# Patient Record
Sex: FEMALE | Race: BLACK OR AFRICAN AMERICAN | NOT HISPANIC OR LATINO | Employment: FULL TIME | URBAN - METROPOLITAN AREA
[De-identification: names, ages, dates, MRNs, and addresses within clinical notes are randomized per-mention and may not be internally consistent; named-entity substitution may affect disease eponyms.]

---

## 2023-10-16 DIAGNOSIS — Z12.31 ENCOUNTER FOR SCREENING MAMMOGRAM FOR MALIGNANT NEOPLASM OF BREAST: Primary | ICD-10-CM

## 2023-10-25 ENCOUNTER — HOSPITAL ENCOUNTER (OUTPATIENT)
Dept: RADIOLOGY | Facility: HOSPITAL | Age: 43
Discharge: HOME | End: 2023-10-25
Attending: FAMILY MEDICINE
Payer: OTHER GOVERNMENT

## 2023-10-25 DIAGNOSIS — Z12.31 ENCOUNTER FOR SCREENING MAMMOGRAM FOR MALIGNANT NEOPLASM OF BREAST: ICD-10-CM

## 2023-10-25 PROCEDURE — 77063 MAMMO DIGITAL SCREENING BILAT WITH TOMO: ICD-10-PCS | Mod: 26,,, | Performed by: RADIOLOGY

## 2023-10-25 PROCEDURE — 77067 MAMMO DIGITAL SCREENING BILAT WITH TOMO: ICD-10-PCS | Mod: 26,,, | Performed by: RADIOLOGY

## 2023-10-25 PROCEDURE — 77067 SCR MAMMO BI INCL CAD: CPT | Mod: 26,,, | Performed by: RADIOLOGY

## 2023-10-25 PROCEDURE — 77067 SCR MAMMO BI INCL CAD: CPT | Mod: TC,PO

## 2023-10-25 PROCEDURE — 77063 BREAST TOMOSYNTHESIS BI: CPT | Mod: 26,,, | Performed by: RADIOLOGY

## 2023-11-06 ENCOUNTER — TELEPHONE (OUTPATIENT)
Dept: PRIMARY CARE CLINIC | Facility: CLINIC | Age: 43
End: 2023-11-06
Payer: OTHER GOVERNMENT

## 2023-11-06 NOTE — TELEPHONE ENCOUNTER
Returned the patient call no answer Left a Voicemail.         ----- Message from Negrita Scanlon sent at 11/6/2023  1:39 PM CST -----  Type: Patient Call Back    Who called:Self     What is the request in detail: Asking for a call     Can the clinic reply by [facility]? No     Would the patient rather a call back or a response via [facility]? Call     Best call back number: [phone] (home)

## 2023-11-07 ENCOUNTER — TELEPHONE (OUTPATIENT)
Dept: PRIMARY CARE CLINIC | Facility: CLINIC | Age: 43
End: 2023-11-07
Payer: OTHER GOVERNMENT

## 2023-11-07 NOTE — TELEPHONE ENCOUNTER
Returned the patient call to inquire about her call patient states that she was not to received the call.  She was reaching to her primary care team Dr Valle.          ----- Message from Carol Martin sent at 11/7/2023 11:43 AM CST -----  Contact: [phone] Patient  Patient is returning a phone call.  Who left a message for the patient: Jojo Joyner MA  Does patient know what this is regarding:  ?  Would you like a call back, or a response through your [facility] portal?:   per pt whichever is the quickest  Comments:

## 2023-11-13 DIAGNOSIS — N63.10 MASS OF RIGHT BREAST: Primary | ICD-10-CM

## 2023-11-22 ENCOUNTER — HOSPITAL ENCOUNTER (OUTPATIENT)
Dept: RADIOLOGY | Facility: HOSPITAL | Age: 43
Discharge: HOME | End: 2023-11-22
Attending: FAMILY MEDICINE
Payer: OTHER GOVERNMENT

## 2023-11-22 DIAGNOSIS — N63.10 MASS OF RIGHT BREAST: ICD-10-CM

## 2023-11-22 PROCEDURE — 77065 DX MAMMO INCL CAD UNI: CPT | Mod: TC,RT

## 2023-11-22 PROCEDURE — 76642 US BREAST RIGHT LIMITED: ICD-10-PCS | Mod: 26,RT,, | Performed by: RADIOLOGY

## 2023-11-22 PROCEDURE — 77061 MAMMO DIGITAL DIAGNOSTIC RIGHT WITH TOMO: ICD-10-PCS | Mod: 26,RT,, | Performed by: RADIOLOGY

## 2023-11-22 PROCEDURE — 77065 DX MAMMO INCL CAD UNI: CPT | Mod: 26,RT,, | Performed by: RADIOLOGY

## 2023-11-22 PROCEDURE — 76642 ULTRASOUND BREAST LIMITED: CPT | Mod: TC,RT

## 2023-11-22 PROCEDURE — 77061 BREAST TOMOSYNTHESIS UNI: CPT | Mod: 26,RT,, | Performed by: RADIOLOGY

## 2023-11-22 PROCEDURE — 77065 MAMMO DIGITAL DIAGNOSTIC RIGHT WITH TOMO: ICD-10-PCS | Mod: 26,RT,, | Performed by: RADIOLOGY

## 2023-11-22 PROCEDURE — 76642 ULTRASOUND BREAST LIMITED: CPT | Mod: 26,RT,, | Performed by: RADIOLOGY

## 2023-11-29 ENCOUNTER — HOSPITAL ENCOUNTER (OUTPATIENT)
Dept: RADIOLOGY | Facility: HOSPITAL | Age: 43
Discharge: HOME | End: 2023-11-29
Attending: FAMILY MEDICINE
Payer: OTHER GOVERNMENT

## 2023-11-29 VITALS — HEIGHT: 65 IN | BODY MASS INDEX: 33.32 KG/M2 | WEIGHT: 200 LBS

## 2023-11-29 DIAGNOSIS — R92.8 ABNORMAL MAMMOGRAM OF RIGHT BREAST: ICD-10-CM

## 2023-11-29 PROCEDURE — 77065 DX MAMMO INCL CAD UNI: CPT | Mod: TC,RT

## 2023-11-29 PROCEDURE — 77065 MAMMO DIGITAL DIAGNOSTIC RIGHT: ICD-10-PCS | Mod: 26,RT,, | Performed by: RADIOLOGY

## 2023-11-29 PROCEDURE — 77065 DX MAMMO INCL CAD UNI: CPT | Mod: 26,RT,, | Performed by: RADIOLOGY

## 2023-11-29 PROCEDURE — 88305 TISSUE EXAM BY PATHOLOGIST: ICD-10-PCS | Mod: 26,,, | Performed by: PATHOLOGY

## 2023-11-29 PROCEDURE — A4648 IMPLANTABLE TISSUE MARKER: HCPCS

## 2023-11-29 PROCEDURE — 88305 TISSUE EXAM BY PATHOLOGIST: CPT | Performed by: PATHOLOGY

## 2023-11-29 PROCEDURE — 27200939 US BREAST BIOPSY WITH IMAGING 1ST SITE RIGHT

## 2023-11-29 PROCEDURE — 19083 US BREAST BIOPSY WITH IMAGING 1ST SITE RIGHT: ICD-10-PCS | Mod: RT,,, | Performed by: RADIOLOGY

## 2023-11-29 PROCEDURE — 25000003 PHARM REV CODE 250

## 2023-11-29 PROCEDURE — 88305 TISSUE EXAM BY PATHOLOGIST: CPT | Mod: 26,,, | Performed by: PATHOLOGY

## 2023-11-29 PROCEDURE — 19083 BX BREAST 1ST LESION US IMAG: CPT | Mod: RT,,, | Performed by: RADIOLOGY

## 2023-11-29 RX ORDER — LIDOCAINE HYDROCHLORIDE AND EPINEPHRINE 20; 10 MG/ML; UG/ML
10 INJECTION, SOLUTION INFILTRATION; PERINEURAL ONCE
Status: COMPLETED | OUTPATIENT
Start: 2023-11-29 | End: 2023-11-29

## 2023-11-29 RX ORDER — LIDOCAINE HYDROCHLORIDE 20 MG/ML
10 INJECTION, SOLUTION INFILTRATION; PERINEURAL ONCE
Status: COMPLETED | OUTPATIENT
Start: 2023-11-29 | End: 2023-11-29

## 2023-11-29 RX ADMIN — LIDOCAINE HYDROCHLORIDE,EPINEPHRINE BITARTRATE 10 ML: 20; .01 INJECTION, SOLUTION INFILTRATION; PERINEURAL at 02:11

## 2023-11-29 RX ADMIN — LIDOCAINE HYDROCHLORIDE 10 ML: 20 INJECTION, SOLUTION INFILTRATION; PERINEURAL at 02:11

## 2023-12-04 LAB
FINAL PATHOLOGIC DIAGNOSIS: NORMAL
GROSS: NORMAL
Lab: NORMAL

## 2024-02-02 ENCOUNTER — OFFICE VISIT (OUTPATIENT)
Dept: SURGERY | Facility: CLINIC | Age: 44
End: 2024-02-02
Payer: OTHER GOVERNMENT

## 2024-02-02 VITALS
BODY MASS INDEX: 33.33 KG/M2 | HEART RATE: 73 BPM | WEIGHT: 200.06 LBS | HEIGHT: 65 IN | DIASTOLIC BLOOD PRESSURE: 89 MMHG | TEMPERATURE: 98 F | SYSTOLIC BLOOD PRESSURE: 135 MMHG

## 2024-02-02 DIAGNOSIS — N63.0 BREAST MASS IN FEMALE: Primary | ICD-10-CM

## 2024-02-02 DIAGNOSIS — N63.10 BREAST MASS, RIGHT: ICD-10-CM

## 2024-02-02 DIAGNOSIS — D24.1 BENIGN NEOPLASM OF RIGHT BREAST: ICD-10-CM

## 2024-02-02 PROCEDURE — 99215 OFFICE O/P EST HI 40 MIN: CPT | Mod: PBBFAC | Performed by: SURGERY

## 2024-02-02 PROCEDURE — 99203 OFFICE O/P NEW LOW 30 MIN: CPT | Mod: S$PBB,,, | Performed by: SURGERY

## 2024-02-02 PROCEDURE — 99999 PR PBB SHADOW E&M-EST. PATIENT-LVL V: CPT | Mod: PBBFAC,,, | Performed by: SURGERY

## 2024-02-02 RX ORDER — SODIUM CHLORIDE 9 MG/ML
INJECTION, SOLUTION INTRAVENOUS CONTINUOUS
Status: CANCELLED | OUTPATIENT
Start: 2024-02-02

## 2024-02-02 RX ORDER — CEFAZOLIN SODIUM 2 G/50ML
2 SOLUTION INTRAVENOUS
Status: CANCELLED | OUTPATIENT
Start: 2024-02-02

## 2024-02-02 RX ORDER — FERROUS SULFATE 325(65) MG
325 TABLET ORAL
COMMUNITY
Start: 2023-10-10

## 2024-02-02 RX ORDER — AMLODIPINE BESYLATE 10 MG/1
0.5 TABLET ORAL DAILY
COMMUNITY
Start: 2023-07-31

## 2024-02-02 RX ORDER — CHOLECALCIFEROL (VITAMIN D3) 50 MCG
50 TABLET ORAL
COMMUNITY
Start: 2023-03-30

## 2024-02-02 RX ORDER — ESCITALOPRAM OXALATE 20 MG/1
1 TABLET ORAL DAILY
COMMUNITY
Start: 2023-03-30 | End: 2024-02-05 | Stop reason: CLARIF

## 2024-02-02 RX ORDER — METOPROLOL TARTRATE 100 MG/1
50 TABLET ORAL
COMMUNITY
Start: 2023-09-29

## 2024-02-02 NOTE — H&P
History & Physical    SUBJECTIVE:     History of Present Illness:  Patient is a 43 y.o. female presents with palpable breast mass, s/p biopsy. She had identified a mass herself (on recommendation to perform self breast exam) and found a mass. Ultrasound was BR4 and biopsy performed w marker placement. Benign findings but warrants removal.  Has hx of breast cancer in maternal aunt.  She denies any major medical problems. Interested in surgery.    Chief Complaint   Patient presents with    Consult    Lump     Right breast        Review of patient's allergies indicates:   Allergen Reactions    Pseudoephedrine Nausea And Vomiting and Rash     Other Reaction(s): Eruption, Throat irritation       Current Outpatient Medications   Medication Sig Dispense Refill    amLODIPine (NORVASC) 10 MG tablet Take 0.5 tablets by mouth once daily.      cholecalciferol, vitamin D3, (VITAMIN D3) 50 mcg (2,000 unit) Tab 50 mcg.      EScitalopram oxalate (LEXAPRO) 20 MG tablet Take 1 tablet by mouth once daily.      ferrous sulfate (FEOSOL) 325 mg (65 mg iron) Tab tablet 325 mg.      metoprolol tartrate (LOPRESSOR) 100 MG tablet 50 mg.      acetaminophen-codeine 300-30mg (TYLENOL #3) 300-30 mg Tab Take 1 tablet by mouth every 6 (six) hours as needed (moderate to severe pain). 15 tablet 0    aspirin 81 MG Chew Take 81 mg by mouth once daily.      prenatal vit #108-iron-FA 30 mg iron- 800 mcg Tab Take by mouth.       No current facility-administered medications for this visit.       Past Medical History:   Diagnosis Date    Anemia     Bipolar 1 disorder     Migraine headache     Personality disorder     PTSD (post-traumatic stress disorder)     off medications since pregnancy     No past surgical history on file.  No family history on file.  Social History     Tobacco Use    Smoking status: Former     Current packs/day: 0.00     Types: Cigarettes     Quit date: 5/1/2013     Years since quitting: 10.7   Substance Use Topics    Alcohol use: Yes  "    Comment: 2x week    Drug use: No     Comment: 7 months THC        Review of Systems:  Review of Systems   Constitutional:  Negative for chills and fever.   HENT: Negative.     Eyes: Negative.    Respiratory:  Negative for cough, chest tightness and shortness of breath.    Cardiovascular: Negative.    Gastrointestinal:  Negative for abdominal pain, blood in stool, constipation, diarrhea, nausea and vomiting.   Endocrine: Negative for cold intolerance and heat intolerance.   Genitourinary: Negative.    Musculoskeletal: Negative.    Skin: Negative.  Negative for rash.   Neurological:  Negative for dizziness, syncope and light-headedness.   Psychiatric/Behavioral:  Negative for agitation, confusion and hallucinations.        OBJECTIVE:     Vital Signs (Most Recent)  Temp: 98.1 °F (36.7 °C) (02/02/24 1058)  Pulse: 73 (02/02/24 1058)  BP: 135/89 (02/02/24 1058)  5' 5" (1.651 m)  90.8 kg (200 lb 1.1 oz)     Physical Exam:  Physical Exam  Constitutional:       General: She is not in acute distress.     Appearance: She is well-developed. She is not diaphoretic.   HENT:      Head: Normocephalic and atraumatic.   Eyes:      Conjunctiva/sclera: Conjunctivae normal.      Pupils: Pupils are equal, round, and reactive to light.   Cardiovascular:      Rate and Rhythm: Normal rate and regular rhythm.      Pulses: Normal pulses.      Heart sounds: Normal heart sounds.   Pulmonary:      Effort: Pulmonary effort is normal. No respiratory distress.      Breath sounds: Normal breath sounds. No stridor. No wheezing.   Chest:   Breasts:     Right: Mass present.      Left: Normal.          Comments: Breast mass, difficult to separate on exam from areolar tissue, but has some scattered thickened areas.  Abdominal:      General: Bowel sounds are normal. There is no distension.      Palpations: Abdomen is soft.      Tenderness: There is no abdominal tenderness.   Musculoskeletal:         General: Normal range of motion.      Cervical back: " Normal range of motion and neck supple.   Lymphadenopathy:      Upper Body:      Right upper body: No supraclavicular, axillary or pectoral adenopathy.      Left upper body: No supraclavicular, axillary or pectoral adenopathy.   Skin:     General: Skin is warm and dry.      Findings: No rash.   Neurological:      Mental Status: She is alert and oriented to person, place, and time.      Cranial Nerves: No cranial nerve deficit.   Psychiatric:         Behavior: Behavior normal.       Diagnostic Results:  US: Reviewed  Br4   US Breast Right Limited  Right  Mass: There is an 8 mm x 12 mm x 14 mm oval, parallel, heterogeneous mass with circumscribed margins with posterior enhancement seen in the retroareolar region of the right breast in the middle depth.      Impression:  Right  Mass: Right breast mass at the central middle position. Assessment: 4 - Suspicious finding. Biopsy is recommended.      BI-RADS Category:   Overall: 4 - Suspicious    pathology  Final Pathologic Diagnosis Right breast, retroareolar, biopsy:  Fibroepithelial lesion with myxoid stroma  No evidence of atypia or malignancy       ASSESSMENT/PLAN:     43 yr old black lady w right sided breast mass    PLAN:Plan     To OR for needle loc partial mastectomy/lumpectomy on 2/8/24  Risks and side effects discussed with the patient. Alterative therapies discussed. Patient agreeable to procedure and has signed consent which was discussed in detail.    Duncan Pineda  General Surgery - [facility]  2/2/2024  11:39 AM

## 2024-02-05 ENCOUNTER — HOSPITAL ENCOUNTER (OUTPATIENT)
Dept: RADIOLOGY | Facility: HOSPITAL | Age: 44
Discharge: HOME | End: 2024-02-05
Attending: SURGERY
Payer: OTHER GOVERNMENT

## 2024-02-05 ENCOUNTER — HOSPITAL ENCOUNTER (OUTPATIENT)
Dept: PREADMISSION TESTING | Facility: HOSPITAL | Age: 44
Discharge: HOME | End: 2024-02-05
Attending: SURGERY
Payer: OTHER GOVERNMENT

## 2024-02-05 ENCOUNTER — ANESTHESIA EVENT (OUTPATIENT)
Dept: SURGERY | Facility: HOSPITAL | Age: 44
End: 2024-02-05
Payer: OTHER GOVERNMENT

## 2024-02-05 VITALS
BODY MASS INDEX: 33.02 KG/M2 | HEART RATE: 70 BPM | SYSTOLIC BLOOD PRESSURE: 134 MMHG | WEIGHT: 198.19 LBS | DIASTOLIC BLOOD PRESSURE: 87 MMHG | RESPIRATION RATE: 20 BRPM | TEMPERATURE: 98 F | OXYGEN SATURATION: 99 % | HEIGHT: 65 IN

## 2024-02-05 DIAGNOSIS — Z01.818 PREOP TESTING: Primary | ICD-10-CM

## 2024-02-05 DIAGNOSIS — N63.0 BREAST MASS IN FEMALE: ICD-10-CM

## 2024-02-05 LAB
ANION GAP SERPL CALC-SCNC: 7 MMOL/L (ref 8–16)
BASOPHILS # BLD AUTO: 0.03 K/UL (ref 0–0.2)
BASOPHILS NFR BLD: 0.5 % (ref 0–1.9)
BUN SERPL-MCNC: 12 MG/DL (ref 6–20)
CALCIUM SERPL-MCNC: 9.7 MG/DL (ref 8.7–10.5)
CHLORIDE SERPL-SCNC: 107 MMOL/L (ref 95–110)
CO2 SERPL-SCNC: 26 MMOL/L (ref 23–29)
CREAT SERPL-MCNC: 0.8 MG/DL (ref 0.5–1.4)
DIFFERENTIAL METHOD BLD: ABNORMAL
EOSINOPHIL # BLD AUTO: 0.1 K/UL (ref 0–0.5)
EOSINOPHIL NFR BLD: 0.8 % (ref 0–8)
ERYTHROCYTE [DISTWIDTH] IN BLOOD BY AUTOMATED COUNT: 14.6 % (ref 11.5–14.5)
EST. GFR  (NO RACE VARIABLE): >60 ML/MIN/1.73 M^2
GLUCOSE SERPL-MCNC: 91 MG/DL (ref 70–110)
HCT VFR BLD AUTO: 36.4 % (ref 37–48.5)
HGB BLD-MCNC: 11.9 G/DL (ref 12–16)
IMM GRANULOCYTES # BLD AUTO: 0.01 K/UL (ref 0–0.04)
IMM GRANULOCYTES NFR BLD AUTO: 0.2 % (ref 0–0.5)
LYMPHOCYTES # BLD AUTO: 2.8 K/UL (ref 1–4.8)
LYMPHOCYTES NFR BLD: 42.8 % (ref 18–48)
MCH RBC QN AUTO: 30 PG (ref 27–31)
MCHC RBC AUTO-ENTMCNC: 32.7 G/DL (ref 32–36)
MCV RBC AUTO: 92 FL (ref 82–98)
MONOCYTES # BLD AUTO: 0.4 K/UL (ref 0.3–1)
MONOCYTES NFR BLD: 5.9 % (ref 4–15)
NEUTROPHILS # BLD AUTO: 3.2 K/UL (ref 1.8–7.7)
NEUTROPHILS NFR BLD: 49.8 % (ref 38–73)
NRBC BLD-RTO: 0 /100 WBC
OHS QRS DURATION: 82 MS
OHS QTC CALCULATION: 363 MS
PLATELET # BLD AUTO: 208 K/UL (ref 150–450)
PMV BLD AUTO: 11.3 FL (ref 9.2–12.9)
POTASSIUM SERPL-SCNC: 3.8 MMOL/L (ref 3.5–5.1)
RBC # BLD AUTO: 3.97 M/UL (ref 4–5.4)
SODIUM SERPL-SCNC: 140 MMOL/L (ref 136–145)
WBC # BLD AUTO: 6.45 K/UL (ref 3.9–12.7)

## 2024-02-05 PROCEDURE — 80048 BASIC METABOLIC PNL TOTAL CA: CPT | Performed by: SURGERY

## 2024-02-05 PROCEDURE — 93010 ELECTROCARDIOGRAM REPORT: CPT | Mod: ,,, | Performed by: INTERNAL MEDICINE

## 2024-02-05 PROCEDURE — 71046 X-RAY EXAM CHEST 2 VIEWS: CPT | Mod: TC,FY

## 2024-02-05 PROCEDURE — 93005 ELECTROCARDIOGRAM TRACING: CPT

## 2024-02-05 PROCEDURE — 85025 COMPLETE CBC W/AUTO DIFF WBC: CPT | Performed by: SURGERY

## 2024-02-05 PROCEDURE — 71046 X-RAY EXAM CHEST 2 VIEWS: CPT | Mod: 26,,, | Performed by: RADIOLOGY

## 2024-02-05 NOTE — ANESTHESIA PREPROCEDURE EVALUATION
02/05/2024  Mili Newsome is a 43 y.o., female  To undergo Procedure(s) (LRB):  BIOPSY, BREAST, WITH LUMPECTOMY (Right)     Denies CP/SOB/GERD/MI/CVA/URI symptoms.  METS > 4  NPO > 8    Past Medical History:  Past Medical History:   Diagnosis Date    Anemia     Bipolar 1 disorder     Hypertension     Migraine headache     Personality disorder     PTSD (post-traumatic stress disorder)     off medications since pregnancy       Past Surgical History:  Past Surgical History:   Procedure Laterality Date    DILATION AND CURETTAGE OF UTERUS      X 2    TUBAL LIGATION         Social History:  Social History     Socioeconomic History    Marital status: Single   Tobacco Use    Smoking status: Former     Current packs/day: 0.00     Types: Cigarettes     Quit date: 5/1/2013     Years since quitting: 10.7   Substance and Sexual Activity    Alcohol use: Yes     Comment: 2x week    Drug use: No     Comment: 7 months THC     Social Determinants of Health     Financial Resource Strain: Low Risk  (2/2/2024)    Overall Financial Resource Strain (CARDIA)     Difficulty of Paying Living Expenses: Not hard at all   Food Insecurity: No Food Insecurity (2/2/2024)    Hunger Vital Sign     Worried About Running Out of Food in the Last Year: Never true     Ran Out of Food in the Last Year: Never true   Transportation Needs: Unmet Transportation Needs (2/2/2024)    PRAPARE - Transportation     Lack of Transportation (Medical): Yes   Physical Activity: Insufficiently Active (2/2/2024)    Exercise Vital Sign     Days of Exercise per Week: 5 days     Minutes of Exercise per Session: 20 min   Stress: Stress Concern Present (2/2/2024)    [employer] [place] [employer] - Occupational Stress Questionnaire     Feeling of Stress : To some extent   Social Connections: Unknown (2/2/2024)    Social Connection and Isolation Panel  [NHANES]     Frequency of Communication with Friends and Family: More than three times a week     Frequency of Social Gatherings with Friends and Family: Once a week     Active Member of Clubs or Organizations: Yes     Attends Club or Organization Meetings: More than 4 times per year     Marital Status:    Housing Stability: Low Risk  (2/2/2024)    Housing Stability Vital Sign     Unable to Pay for Housing in the Last Year: No     Number of Places Lived in the Last Year: 1     Unstable Housing in the Last Year: No       Medications:  No current facility-administered medications on file prior to encounter.     Current Outpatient Medications on File Prior to Encounter   Medication Sig Dispense Refill    cholecalciferol, vitamin D3, (VITAMIN D3) 50 mcg (2,000 unit) Tab 50 mcg.      ferrous sulfate (FEOSOL) 325 mg (65 mg iron) Tab tablet 325 mg.      amLODIPine (NORVASC) 10 MG tablet Take 0.5 tablets by mouth once daily.      metoprolol tartrate (LOPRESSOR) 100 MG tablet 50 mg.         Allergies:  Review of patient's allergies indicates:   Allergen Reactions    Pseudoephedrine Nausea And Vomiting and Rash     Other Reaction(s): Eruption, Throat irritation       Active Problems:  Patient Active Problem List   Diagnosis    Miscarriage       Diagnostic Studies:   Latest Reference Range & Units 02/05/24 14:25   WBC 3.90 - 12.70 K/uL 6.45   RBC 4.00 - 5.40 M/uL 3.97 (L)   Hemoglobin 12.0 - 16.0 g/dL 11.9 (L)   Hematocrit 37.0 - 48.5 % 36.4 (L)   MCV 82 - 98 fL 92   MCH 27.0 - 31.0 pg 30.0   MCHC 32.0 - 36.0 g/dL 32.7   RDW 11.5 - 14.5 % 14.6 (H)   Platelet Count 150 - 450 K/uL 208   MPV 9.2 - 12.9 fL 11.3   Gran % 38.0 - 73.0 % 49.8   Lymph % 18.0 - 48.0 % 42.8   Mono % 4.0 - 15.0 % 5.9   Eos % 0.0 - 8.0 % 0.8   Basophil % 0.0 - 1.9 % 0.5   Immature Granulocytes 0.0 - 0.5 % 0.2   Gran # (ANC) 1.8 - 7.7 K/uL 3.2   Lymph # 1.0 - 4.8 K/uL 2.8   Mono # 0.3 - 1.0 K/uL 0.4   Eos # 0.0 - 0.5 K/uL 0.1   Baso # 0.00 - 0.20  K/uL 0.03   Immature Grans (Abs) 0.00 - 0.04 K/uL 0.01   nRBC 0 /100 WBC 0   Differential Method  Automated      Latest Reference Range & Units 02/05/24 14:25   Sodium 136 - 145 mmol/L 140   Potassium 3.5 - 5.1 mmol/L 3.8   Chloride 95 - 110 mmol/L 107   CO2 23 - 29 mmol/L 26   Anion Gap 8 - 16 mmol/L 7 (L)   BUN 6 - 20 mg/dL 12   Creatinine 0.5 - 1.4 mg/dL 0.8   eGFR >60 mL/min/1.73 m^2 >60   Glucose 70 - 110 mg/dL 91   Calcium 8.7 - 10.5 mg/dL 9.7     EKG (2/5/24):  Sinus bradycardia   Minimal voltage criteria for LVH, may be normal variant ( R in aVL )     24 Hour Vitals:      See Nursing Charting For Additional Vitals    Pre-op Assessment    I have reviewed the Patient Summary Reports.     I have reviewed the Nursing Notes. I have reviewed the NPO Status.   I have reviewed the Medications.     Review of Systems  Anesthesia Hx:  No problems with previous Anesthesia             Denies Family Hx of Anesthesia complications.    Denies Personal Hx of Anesthesia complications.                    Social:  Social Alcohol Use, Former Smoker       Hematology/Oncology:       -- Anemia:                              Oncology Comments: R breast mass     Cardiovascular:  Exercise tolerance: good   Hypertension              ECG has been reviewed.  Functional Capacity good / => 4 METS                         Pulmonary:  Pulmonary Normal                       Hepatic/GI:  Hepatic/GI Normal                 Neurological:      Headaches                                 Endocrine:        Obesity / BMI > 30  Psych:  Psychiatric History                  Physical Exam  General: Well nourished and Cooperative    Airway:  Mallampati: III   Mouth Opening: Normal  TM Distance: Normal    Dental:  Intact    Chest/Lungs:  Clear to auscultation, Normal Respiratory Rate    Heart:  Rate: Normal  Rhythm: Regular Rhythm        Anesthesia Plan  Type of Anesthesia, risks & benefits discussed:    Anesthesia Type: Gen Supraglottic Airway, Gen  ETT  Intra-op Monitoring Plan: Standard ASA Monitors  Post Op Pain Control Plan: multimodal analgesia and IV/PO Opioids PRN  Induction:  IV  Informed Consent: Informed consent signed with the Patient and all parties understand the risks and agree with anesthesia plan.  All questions answered. Patient consented to blood products? Yes  ASA Score: 2  Anesthesia Plan Notes:   GA with LMA  Standard ASA monitors  Recovery in PACU  PONV: 3    Ready For Surgery From Anesthesia Perspective.     .

## 2024-02-05 NOTE — DISCHARGE INSTRUCTIONS
YOUR PROCEDURE WILL BE AT [facility] at [address] [place] [address]                  Before 7 AM, enter through the Emergency Entrance..   After 7 AM enter through the Main Entrance.                 Report to the Same Day Surgery Registration Desk in the hallway.(Just beside the Same Day Surgery Unit)      Your procedure  is scheduled for _____02/07/2024_____.    Call [phone] between 2pm and 5pm on ____02/06/2024___to find out your arrival time for the day of surgery.    You may have two visitors.  No children under 12 years old.     You will be going to the Same Day Surgery Unit on the 2nd floor of the hospital.    Important instructions:  Do not eat anything after midnight.  You may have plain water, non carbonated.  You may also have Gatorade or Powerade after midnight.    Stop all fluids 2 hours before your surgery.    It is okay to brush your teeth.  Do not have gum, candy or mints.    SEE MEDICATION SHEET.   TAKE MEDICATIONS AS DIRECTED WITH SIPS OF WATER.      Do not take any diabetic medication on the morning of surgery unless instructed to do so by your doctor or pre op nurse.      All GLP-1 weekly diabetic/weight loss medications must not be taken for one week before your surgery, or your surgery could be canceled.      STOP taking Aspirin, Ibuprofen,  Advil, Motrin, Mobic(meloxicam), Aleve (naproxen), Fish oil, and Vitamin E for at least 7 days before your surgery.     You may take Tylenol if needed which is not a blood thinner.    Please shower the night before and the morning of your surgery.      Follow any Prep Instructions given by your surgeon.    Use Chlorhexidine soap as instructed by your pre op nurse.   Please place clean linens on your bed the night before surgery. Please wear fresh clean clothing after each shower.    No shaving of procedural area at least 4-5 days before surgery due to increased risk of skin irritation and/or possible  infection.    Female patients may be asked for a urine specimen on the morning of the surgery.  Please check with your nurse before using the restroom.    Contact lenses and removable denture work may not be worn during your procedure.    You may wear deodorant only. If you are having breast surgery, do not wear deodorant on the operative side.    Do not wear powder, body lotion, perfume/cologne or make-up, oils, creams, rubs or sprays.    Do not wear any jewelry or have any metal on your body.    You will be asked to remove any dentures or partials for the procedure.    If you are going home on the same day of surgery, you must arrange for a family member or a friend to drive you home.  Public transportation is prohibited.  You will not be able to drive home if you were given anesthesia or sedation.    Patients who want to have their Post-op prescriptions filled from our in-house [facility], bring a Credit/Debit Card or cash with you. A co-pay may be required.  The pharmacy closes at 5:30 pm.    Wear loose fitting clothes allowing for bandages.    Please leave money and valuables home.      You may bring your cell phone.    Call the doctor if fever or illness should occur before your surgery.    Call [phone] to contact us here if needed.                            CLOTHES ON DAY OF SURGERY    SHOULDER surgery:  you must have a very oversized shirt.  Very, Very large.  You will probably have a large sling on with your arm strapped to your chest.  You will not be able to put the arm of the operated shoulder into a sleeve.  You can put the arm of the un-operated shoulder into the sleeve, but the shirt will need to be draped over the operated shoulder.       ARM or HAND surgery:  make sure that your sleeves are large and loose enough to pass over large dressings or cast.      BREAST or UNDERARM surgery:  wear a loose, button down shirt so that you can dress without raising your arms over your head.    ABDOMINAL  surgery:  wear loose, comfortable clothing.  Nothing tight around the abdomen.  NO JEANS    PENIS or SCROTAL surgery:  loose comfortable clothing.  Large sweat pants, pajama pants or a robe.  ABSOLUTELY NO JEANS      LEG or FOOT surgery:  wear large loose pants that are able to pass over any large dressings or casts.  You could also wear loose shorts or a skirt.

## 2024-02-12 ENCOUNTER — TELEPHONE (OUTPATIENT)
Dept: SURGERY | Facility: HOSPITAL | Age: 44
End: 2024-02-12
Payer: OTHER GOVERNMENT

## 2024-02-14 ENCOUNTER — HOSPITAL ENCOUNTER (OUTPATIENT)
Dept: RADIOLOGY | Facility: HOSPITAL | Age: 44
Discharge: HOME | End: 2024-02-14
Attending: SURGERY
Payer: OTHER GOVERNMENT

## 2024-02-14 ENCOUNTER — ANESTHESIA (OUTPATIENT)
Dept: SURGERY | Facility: HOSPITAL | Age: 44
End: 2024-02-14
Payer: OTHER GOVERNMENT

## 2024-02-14 ENCOUNTER — HOSPITAL ENCOUNTER (OUTPATIENT)
Facility: HOSPITAL | Age: 44
Discharge: HOME | End: 2024-02-14
Attending: SURGERY | Admitting: SURGERY
Payer: OTHER GOVERNMENT

## 2024-02-14 VITALS — BODY MASS INDEX: 33.02 KG/M2 | HEIGHT: 65 IN | WEIGHT: 198.19 LBS

## 2024-02-14 VITALS
WEIGHT: 198.31 LBS | RESPIRATION RATE: 16 BRPM | DIASTOLIC BLOOD PRESSURE: 76 MMHG | TEMPERATURE: 98 F | BODY MASS INDEX: 33 KG/M2 | HEART RATE: 63 BPM | OXYGEN SATURATION: 95 % | SYSTOLIC BLOOD PRESSURE: 128 MMHG

## 2024-02-14 DIAGNOSIS — Z01.818 PREOPERATIVE TESTING: ICD-10-CM

## 2024-02-14 DIAGNOSIS — N63.0 BREAST MASS IN FEMALE: Primary | ICD-10-CM

## 2024-02-14 DIAGNOSIS — N63.0 BREAST MASS IN FEMALE: ICD-10-CM

## 2024-02-14 DIAGNOSIS — N63.10 BREAST MASS, RIGHT: ICD-10-CM

## 2024-02-14 LAB
B-HCG UR QL: NEGATIVE
CTP QC/QA: YES

## 2024-02-14 PROCEDURE — 63600175 PHARM REV CODE 636 W HCPCS: Performed by: STUDENT IN AN ORGANIZED HEALTH CARE EDUCATION/TRAINING PROGRAM

## 2024-02-14 PROCEDURE — 37000009 HC ANESTHESIA EA ADD 15 MINS: Performed by: SURGERY

## 2024-02-14 PROCEDURE — 25000003 PHARM REV CODE 250: Performed by: ANESTHESIOLOGY

## 2024-02-14 PROCEDURE — 63600175 PHARM REV CODE 636 W HCPCS: Performed by: SURGERY

## 2024-02-14 PROCEDURE — 77065 DX MAMMO INCL CAD UNI: CPT | Mod: 26,RT,, | Performed by: RADIOLOGY

## 2024-02-14 PROCEDURE — 63600175 PHARM REV CODE 636 W HCPCS: Performed by: ANESTHESIOLOGY

## 2024-02-14 PROCEDURE — 71000033 HC RECOVERY, INTIAL HOUR: Performed by: SURGERY

## 2024-02-14 PROCEDURE — 19125 EXCISION BREAST LESION: CPT | Mod: RT,,, | Performed by: SURGERY

## 2024-02-14 PROCEDURE — 76098 X-RAY EXAM SURGICAL SPECIMEN: CPT | Mod: TC

## 2024-02-14 PROCEDURE — D9220A PRA ANESTHESIA: Mod: CRNA,,, | Performed by: STUDENT IN AN ORGANIZED HEALTH CARE EDUCATION/TRAINING PROGRAM

## 2024-02-14 PROCEDURE — 25000003 PHARM REV CODE 250: Performed by: SURGERY

## 2024-02-14 PROCEDURE — 77065 DX MAMMO INCL CAD UNI: CPT | Mod: TC,RT

## 2024-02-14 PROCEDURE — 76098 X-RAY EXAM SURGICAL SPECIMEN: CPT | Mod: 26,,, | Performed by: SURGERY

## 2024-02-14 PROCEDURE — 36000707: Performed by: SURGERY

## 2024-02-14 PROCEDURE — 36000706: Performed by: SURGERY

## 2024-02-14 PROCEDURE — 88307 TISSUE EXAM BY PATHOLOGIST: CPT | Performed by: PATHOLOGY

## 2024-02-14 PROCEDURE — 37000008 HC ANESTHESIA 1ST 15 MINUTES: Performed by: SURGERY

## 2024-02-14 PROCEDURE — D9220A PRA ANESTHESIA: Mod: ANES,,, | Performed by: ANESTHESIOLOGY

## 2024-02-14 PROCEDURE — 76942 ECHO GUIDE FOR BIOPSY: CPT | Mod: TC

## 2024-02-14 PROCEDURE — 25000003 PHARM REV CODE 250: Performed by: STUDENT IN AN ORGANIZED HEALTH CARE EDUCATION/TRAINING PROGRAM

## 2024-02-14 PROCEDURE — 76942 ECHO GUIDE FOR BIOPSY: CPT | Mod: 26,,, | Performed by: RADIOLOGY

## 2024-02-14 PROCEDURE — 88307 TISSUE EXAM BY PATHOLOGIST: CPT | Mod: 26,,, | Performed by: PATHOLOGY

## 2024-02-14 PROCEDURE — 81025 URINE PREGNANCY TEST: CPT | Performed by: SURGERY

## 2024-02-14 PROCEDURE — 71000015 HC POSTOP RECOV 1ST HR: Performed by: SURGERY

## 2024-02-14 RX ORDER — KETOROLAC TROMETHAMINE 30 MG/ML
30 INJECTION, SOLUTION INTRAMUSCULAR; INTRAVENOUS ONCE
Status: COMPLETED | OUTPATIENT
Start: 2024-02-14 | End: 2024-02-14

## 2024-02-14 RX ORDER — HYDROCODONE BITARTRATE AND ACETAMINOPHEN 5; 325 MG/1; MG/1
1 TABLET ORAL EVERY 6 HOURS PRN
Qty: 15 TABLET | Refills: 0 | Status: SHIPPED | OUTPATIENT
Start: 2024-02-14

## 2024-02-14 RX ORDER — PROPOFOL 10 MG/ML
VIAL (ML) INTRAVENOUS
Status: DISCONTINUED | OUTPATIENT
Start: 2024-02-14 | End: 2024-02-14

## 2024-02-14 RX ORDER — SODIUM CHLORIDE, SODIUM LACTATE, POTASSIUM CHLORIDE, CALCIUM CHLORIDE 600; 310; 30; 20 MG/100ML; MG/100ML; MG/100ML; MG/100ML
INJECTION, SOLUTION INTRAVENOUS CONTINUOUS
Status: DISCONTINUED | OUTPATIENT
Start: 2024-02-14 | End: 2024-02-14 | Stop reason: HOSPADM

## 2024-02-14 RX ORDER — SCOLOPAMINE TRANSDERMAL SYSTEM 1 MG/1
PATCH, EXTENDED RELEASE TRANSDERMAL
Status: DISCONTINUED | OUTPATIENT
Start: 2024-02-14 | End: 2024-02-14

## 2024-02-14 RX ORDER — LIDOCAINE HYDROCHLORIDE 20 MG/ML
20 INJECTION, SOLUTION INFILTRATION; PERINEURAL ONCE
Status: COMPLETED | OUTPATIENT
Start: 2024-02-14 | End: 2024-02-14

## 2024-02-14 RX ORDER — LIDOCAINE HYDROCHLORIDE AND EPINEPHRINE 20; 10 MG/ML; UG/ML
20 INJECTION, SOLUTION INFILTRATION; PERINEURAL ONCE
Status: COMPLETED | OUTPATIENT
Start: 2024-02-14 | End: 2024-02-14

## 2024-02-14 RX ORDER — HYDROMORPHONE HYDROCHLORIDE 2 MG/ML
0.2 INJECTION, SOLUTION INTRAMUSCULAR; INTRAVENOUS; SUBCUTANEOUS EVERY 5 MIN PRN
Status: DISCONTINUED | OUTPATIENT
Start: 2024-02-14 | End: 2024-02-14 | Stop reason: HOSPADM

## 2024-02-14 RX ORDER — BUPIVACAINE HYDROCHLORIDE AND EPINEPHRINE 2.5; 5 MG/ML; UG/ML
INJECTION, SOLUTION EPIDURAL; INFILTRATION; INTRACAUDAL; PERINEURAL
Status: DISCONTINUED | OUTPATIENT
Start: 2024-02-14 | End: 2024-02-14 | Stop reason: HOSPADM

## 2024-02-14 RX ORDER — PHENYLEPHRINE HYDROCHLORIDE 10 MG/ML
INJECTION INTRAVENOUS
Status: DISCONTINUED | OUTPATIENT
Start: 2024-02-14 | End: 2024-02-14

## 2024-02-14 RX ORDER — HYDROCODONE BITARTRATE AND ACETAMINOPHEN 5; 325 MG/1; MG/1
1 TABLET ORAL EVERY 6 HOURS PRN
Qty: 15 TABLET | Refills: 0 | Status: SHIPPED | OUTPATIENT
Start: 2024-02-14 | End: 2024-02-14

## 2024-02-14 RX ORDER — ACETAMINOPHEN 500 MG
1000 TABLET ORAL
Status: COMPLETED | OUTPATIENT
Start: 2024-02-14 | End: 2024-02-14

## 2024-02-14 RX ORDER — METOPROLOL TARTRATE 50 MG/1
50 TABLET ORAL ONCE
Status: COMPLETED | OUTPATIENT
Start: 2024-02-14 | End: 2024-02-14

## 2024-02-14 RX ORDER — KETOROLAC TROMETHAMINE 10 MG/1
10 TABLET, FILM COATED ORAL EVERY 6 HOURS
Qty: 20 TABLET | Refills: 0 | Status: SHIPPED | OUTPATIENT
Start: 2024-02-14 | End: 2024-02-19

## 2024-02-14 RX ORDER — CEFAZOLIN SODIUM 2 G/50ML
2 SOLUTION INTRAVENOUS
Status: COMPLETED | OUTPATIENT
Start: 2024-02-14 | End: 2024-02-14

## 2024-02-14 RX ORDER — AMLODIPINE BESYLATE 5 MG/1
5 TABLET ORAL ONCE
Status: COMPLETED | OUTPATIENT
Start: 2024-02-14 | End: 2024-02-14

## 2024-02-14 RX ORDER — DEXAMETHASONE SODIUM PHOSPHATE 4 MG/ML
INJECTION, SOLUTION INTRA-ARTICULAR; INTRALESIONAL; INTRAMUSCULAR; INTRAVENOUS; SOFT TISSUE
Status: DISCONTINUED | OUTPATIENT
Start: 2024-02-14 | End: 2024-02-14

## 2024-02-14 RX ORDER — SODIUM CHLORIDE 9 MG/ML
INJECTION, SOLUTION INTRAVENOUS CONTINUOUS
Status: DISCONTINUED | OUTPATIENT
Start: 2024-02-14 | End: 2024-02-14 | Stop reason: HOSPADM

## 2024-02-14 RX ORDER — FENTANYL CITRATE 50 UG/ML
INJECTION, SOLUTION INTRAMUSCULAR; INTRAVENOUS
Status: DISCONTINUED | OUTPATIENT
Start: 2024-02-14 | End: 2024-02-14

## 2024-02-14 RX ORDER — HALOPERIDOL 5 MG/ML
0.5 INJECTION INTRAMUSCULAR EVERY 10 MIN PRN
Status: DISCONTINUED | OUTPATIENT
Start: 2024-02-14 | End: 2024-02-14 | Stop reason: HOSPADM

## 2024-02-14 RX ORDER — LIDOCAINE HYDROCHLORIDE 20 MG/ML
INJECTION INTRAVENOUS
Status: DISCONTINUED | OUTPATIENT
Start: 2024-02-14 | End: 2024-02-14

## 2024-02-14 RX ORDER — ONDANSETRON HYDROCHLORIDE 2 MG/ML
4 INJECTION, SOLUTION INTRAVENOUS DAILY PRN
Status: DISCONTINUED | OUTPATIENT
Start: 2024-02-14 | End: 2024-02-14 | Stop reason: HOSPADM

## 2024-02-14 RX ORDER — MIDAZOLAM HYDROCHLORIDE 1 MG/ML
INJECTION INTRAMUSCULAR; INTRAVENOUS
Status: DISCONTINUED | OUTPATIENT
Start: 2024-02-14 | End: 2024-02-14

## 2024-02-14 RX ORDER — KETOROLAC TROMETHAMINE 10 MG/1
10 TABLET, FILM COATED ORAL EVERY 6 HOURS
Qty: 20 TABLET | Refills: 0 | Status: SHIPPED | OUTPATIENT
Start: 2024-02-14 | End: 2024-02-14

## 2024-02-14 RX ORDER — ONDANSETRON HYDROCHLORIDE 2 MG/ML
INJECTION, SOLUTION INTRAVENOUS
Status: DISCONTINUED | OUTPATIENT
Start: 2024-02-14 | End: 2024-02-14

## 2024-02-14 RX ORDER — SODIUM CHLORIDE 0.9 % (FLUSH) 0.9 %
10 SYRINGE (ML) INJECTION
Status: DISCONTINUED | OUTPATIENT
Start: 2024-02-14 | End: 2024-02-14 | Stop reason: HOSPADM

## 2024-02-14 RX ADMIN — PROPOFOL 30 MG: 10 INJECTION, EMULSION INTRAVENOUS at 12:02

## 2024-02-14 RX ADMIN — PROPOFOL 20 MG: 10 INJECTION, EMULSION INTRAVENOUS at 11:02

## 2024-02-14 RX ADMIN — FENTANYL CITRATE 50 MCG: 50 INJECTION, SOLUTION INTRAMUSCULAR; INTRAVENOUS at 12:02

## 2024-02-14 RX ADMIN — ONDANSETRON 4 MG: 2 INJECTION, SOLUTION INTRAMUSCULAR; INTRAVENOUS at 11:02

## 2024-02-14 RX ADMIN — METOPROLOL TARTRATE 50 MG: 50 TABLET, FILM COATED ORAL at 08:02

## 2024-02-14 RX ADMIN — GLYCOPYRROLATE 0.2 MG: 0.2 INJECTION, SOLUTION INTRAMUSCULAR; INTRAVITREAL at 11:02

## 2024-02-14 RX ADMIN — FENTANYL CITRATE 25 MCG: 50 INJECTION, SOLUTION INTRAMUSCULAR; INTRAVENOUS at 11:02

## 2024-02-14 RX ADMIN — DEXAMETHASONE SODIUM PHOSPHATE 8 MG: 4 INJECTION, SOLUTION INTRAMUSCULAR; INTRAVENOUS at 11:02

## 2024-02-14 RX ADMIN — PROPOFOL 50 MG: 10 INJECTION, EMULSION INTRAVENOUS at 11:02

## 2024-02-14 RX ADMIN — CEFAZOLIN SODIUM 2 G: 2 SOLUTION INTRAVENOUS at 11:02

## 2024-02-14 RX ADMIN — PROPOFOL 180 MG: 10 INJECTION, EMULSION INTRAVENOUS at 11:02

## 2024-02-14 RX ADMIN — KETOROLAC TROMETHAMINE 30 MG: 30 INJECTION, SOLUTION INTRAMUSCULAR; INTRAVENOUS at 01:02

## 2024-02-14 RX ADMIN — PHENYLEPHRINE HYDROCHLORIDE 100 MCG: 10 INJECTION INTRAVENOUS at 12:02

## 2024-02-14 RX ADMIN — FENTANYL CITRATE 25 MCG: 50 INJECTION, SOLUTION INTRAMUSCULAR; INTRAVENOUS at 12:02

## 2024-02-14 RX ADMIN — LIDOCAINE HYDROCHLORIDE 100 MG: 20 INJECTION, SOLUTION INTRAVENOUS at 11:02

## 2024-02-14 RX ADMIN — PHENYLEPHRINE HYDROCHLORIDE 100 MCG: 10 INJECTION INTRAVENOUS at 11:02

## 2024-02-14 RX ADMIN — SCOPALAMINE 1 PATCH: 1 PATCH, EXTENDED RELEASE TRANSDERMAL at 10:02

## 2024-02-14 RX ADMIN — AMLODIPINE BESYLATE 5 MG: 5 TABLET ORAL at 08:02

## 2024-02-14 RX ADMIN — ACETAMINOPHEN 1000 MG: 500 TABLET ORAL at 08:02

## 2024-02-14 RX ADMIN — PHENYLEPHRINE HYDROCHLORIDE 30 MCG/MIN: 10 INJECTION INTRAVENOUS at 12:02

## 2024-02-14 RX ADMIN — LIDOCAINE HYDROCHLORIDE 5 ML: 20 INJECTION, SOLUTION INFILTRATION; PERINEURAL at 08:02

## 2024-02-14 RX ADMIN — LIDOCAINE HYDROCHLORIDE,EPINEPHRINE BITARTRATE 5 ML: 20; .01 INJECTION, SOLUTION INFILTRATION; PERINEURAL at 08:02

## 2024-02-14 RX ADMIN — MIDAZOLAM HYDROCHLORIDE 2 MG: 1 INJECTION INTRAMUSCULAR; INTRAVENOUS at 11:02

## 2024-02-14 RX ADMIN — SODIUM CHLORIDE, SODIUM LACTATE, POTASSIUM CHLORIDE, AND CALCIUM CHLORIDE: .6; .31; .03; .02 INJECTION, SOLUTION INTRAVENOUS at 11:02

## 2024-02-14 NOTE — OP NOTE
"[facility] [place] - Surgery  Operative Note    SUMMARY     Surgery Date: 2/14/2024     Surgeon(s) and Role:     * Duncan Pineda MD - Primary    Assisting Surgeon: Mary Schulte MD    Pre-op Diagnosis:  Breast mass in female [N63.0]    Post-op Diagnosis:  Post-Op Diagnosis Codes:     * Breast mass in female [N63.0]    Procedure(s) (LRB):  BIOPSY, BREAST, WITH LUMPECTOMY (Right)    Anesthesia: General    Indication for procedure: Mili Newsome is 43 y.o. female with Birads 4 Mass identified on US/MMG biopsied as "Fibroepithelial lesion with myxoid stroma". After discussion of disease process, we discussed options and have elected for operation to perform excision of mass, using Wire/Needle Localization.    Description of Procedure: After consent was obtained, patient was taken to the OR. The right breast was prepped and draped in a standard sterile fashion after general anesthesia was started. Time out was performed. Antibiotics were started with ancef. We began the procedure by trimming the Wire to a shorter size and injecting marcaine at the edge of the areola, laterally, we dissected down and removed the retroareolar tissue around the tip of the wire. This was marked as "Short Superior, Long Lateral, Double Long Posterior" using silk suture. We checking the mass on the [employer] imaging system and verified that we removed the Twirl Marker, the tip of the wire, as well as the abnormal tissue based on radiology. We then irrigated and achieved hemostasis with bovie electrocautery, followed by closing in layers with 3-0 vicryl and 4-0 monocryl. We did excise edge of the skin which was damage by the bovie. We covered with sterile dressing.  All counts were correct x2. Patient was awakened from anesthesia and taken to the recovery room in a stable condition having suffered no issues at this time.    Description of the findings of the procedure: mass resected, retroareolar, using Twirl Marker/Wire-Needle " "Localization.  Marked Short Superior, Long Lateral, Double Long Posterior"    Estimated Blood Loss: * No values recorded between 2/14/2024 12:00 PM and 2/14/2024 12:55 PM *         Specimens:   Specimen (24h ago, onward)       Start     Ordered    02/14/24 1240  Specimen to Pathology, Surgery General Surgery  Once        Comments: Pre-op Diagnosis: Breast mass in female [N63.0]Procedure(s):BIOPSY, BREAST, WITH LUMPECTOMY Number of specimens: 1Name of specimens: right breast lumpectomy W/3 sutures   long stitch:lateral,  short double stitch:superior,  long double stitch:posterior     References:    Click here for ordering Quick Tip   Question Answer Comment   Procedure Type: General Surgery    Specimen Class: Routine/Screening    Which provider would you like to cc? NIRAJ HAYES    Release to patient Immediate        02/14/24 1248                  Drains/Implants: None    "

## 2024-02-14 NOTE — TRANSFER OF CARE
Anesthesia Transfer of Care Note    Patient: Mili Newsome    Procedure(s) Performed: Procedure(s) (LRB):  BIOPSY, BREAST, WITH LUMPECTOMY (Right)    Patient location: PACU    Anesthesia Type: general    Transport from OR: Transported from OR on 6-10 L/min O2 by face mask with adequate spontaneous ventilation    Post pain: adequate analgesia    Post assessment: no apparent anesthetic complications    Post vital signs: stable    Level of consciousness: lethargic    Nausea/Vomiting: no nausea/vomiting    Complications: none    Transfer of care protocol was followed      Last vitals: Visit Vitals  BP (!) 161/101 (Patient Position: Lying)   Pulse 80   Temp 36.4 °C (97.5 °F) (Temporal)   Resp 18   Wt 89.9 kg (198 lb 4.8 oz)   LMP 02/12/2024 (Exact Date)   SpO2 99%   Breastfeeding No   BMI 33.00 kg/m²

## 2024-02-14 NOTE — DISCHARGE SUMMARY
[facility] - Surgery  Discharge Note  Short Stay    Procedure(s) (LRB):  BIOPSY, BREAST, WITH LUMPECTOMY (Right)    OUTCOME: Patient tolerated treatment/procedure well without complication and is now ready for discharge.    DISPOSITION: Home or Self Care    FINAL DIAGNOSIS:  Right sided breast mass    FOLLOWUP: In clinic    DISCHARGE INSTRUCTIONS:    Discharge Procedure Orders   Diet Adult Regular     Notify your health care provider if you experience any of the following:  temperature >100.4     Notify your health care provider if you experience any of the following:  persistent nausea and vomiting or diarrhea     Notify your health care provider if you experience any of the following:  severe uncontrolled pain     Notify your health care provider if you experience any of the following:  redness, tenderness, or signs of infection (pain, swelling, redness, odor or green/yellow discharge around incision site)     No dressing needed     Activity as tolerated      TIME SPENT ON DISCHARGE: 12 minutes

## 2024-02-14 NOTE — ANESTHESIA PROCEDURE NOTES
Intubation    Date/Time: 2/14/2024 11:42 AM    Performed by: Jose Roman CRNA  Authorized by: Angus Rivers MD    Intubation:     Induction:  Intravenous    Intubated:  Postinduction    Mask Ventilation:  Easy mask    Attempts:  1    Attempted By:  CRNA    Difficult Airway Encountered?: No      Airway Device:  Supraglottic airway/LMA    Airway Device Size:  4.0    Style/Cuff Inflation:  Cuffed (inflated to minimal occlusive pressure)    Placement Verified By:  Capnometry    Findings Post-Intubation:  BS equal bilateral

## 2024-02-14 NOTE — DISCHARGE INSTRUCTIONS
[facility]  Office # [phone]    Discharge Instructions for Same Day Surgery     Call the office for and appointment if one has not already been made.     Diet: Drink plenty of fluids the first 48 hours and you may resume your   usual diet.     Activity: No heavy lifting (over 10 pounds), pushing or pulling until your   post op visit.     Your doctor's office may have told you to limit your lifting to less weight, or even no weight.  Be sure to follow those instructions.    Note: You may ride in a car and you may drive when comfortable.     Do not drive, drink alcohol, or sign legal documents for 24 hours, or if taking narcotic pain medication.    Dressings: Remove the dressing 24 hours after surgery. You may shower  24 hours after surgery and you may wash your hair.     If you have steri strips ( appears to be strips of white tape) on   your incision, leave them on. In 5-7 days they will begin to fall off.    Wear your bra as much as possible for the next 7 days.     Medical: Call the doctor for any of the following problems: fever above 101,   severe pain, bleeding, or abdominal distention (swelling).   If constipated you may take any stool softener you choose.     Occasionally small areas of skin numbness or an unpleasant skin sensation can result. Also, you may find that your incision is swollen and tender for a few days.  Some redness around sutures and staples is a normal reaction, but if the discomfort persists or worsens, call you doctor.   Fall Prevention  Millions of people fall every year and injure themselves. You may have had anesthesia or sedation which may increase your risk of falling. You may have health issues that put you at an increased risk of falling.     Here are ways to reduce your risk of falling.    Make your home safe by keeping walkways clear of objects you may trip over.  Use non-slip pads under rugs. Do not use area rugs or small throw rugs.  Use non-slip mats  in bathtubs and showers.  Install handrails and lights on staircases.  Do not walk in poorly lit areas.  Do not stand on chairs or wobbly ladders.  Use caution when reaching overhead or looking upward. This position can cause a loss of balance.  Be sure your shoes fit properly, have non-slip bottoms and are in good condition.   Wear shoes both inside and out. Avoid going barefoot or wearing slippers.  Be cautious when going up and down stairs, curbs, and when walking on uneven sidewalks.  If your balance is poor, consider using a cane or walker.  If your fall was related to alcohol use, stop or limit alcohol intake.   If your fall was related to use of sleeping medicines, talk to your doctor about this. You may need to reduce your dosage at bedtime if you awaken during the night to go to the bathroom.    To reduce the need for nighttime bathroom trips:  Avoid drinking fluids for several hours before going to bed  Empty your bladder before going to bed  Men can keep a urinal at the bedside  Stay as active as you can. Balance, flexibility, strength, and endurance all come from exercise. They all play a role in preventing falls. Ask your healthcare provider which types of activity are right for you.  Get your vision checked on a regular basis.  If you have pets, know where they are before you stand up or walk so you don't trip over them.  Use night lights.

## 2024-02-15 NOTE — ANESTHESIA POSTPROCEDURE EVALUATION
Anesthesia Post Evaluation    Patient: Mili Newsome    Procedure(s) Performed: Procedure(s) (LRB):  BIOPSY, BREAST, WITH LUMPECTOMY (Right)    Final Anesthesia Type: general      Patient location during evaluation: PACU  Patient participation: Yes- Able to Participate  Level of consciousness: awake and alert and oriented  Post-procedure vital signs: reviewed and stable  Pain management: adequate  Airway patency: patent    PONV status at discharge: No PONV  Anesthetic complications: no      Cardiovascular status: hemodynamically stable and blood pressure returned to baseline  Respiratory status: spontaneous ventilation, room air and unassisted  Hydration status: euvolemic  Follow-up not needed.              Vitals Value Taken Time   /76 02/14/24 1437   Temp 36.7 °C (98 °F) 02/14/24 1437   Pulse 63 02/14/24 1437   Resp 16 02/14/24 1437   SpO2 95 % 02/14/24 1437         Event Time   Out of Recovery 13:53:23         Pain/Patricia Score: Pain Rating Prior to Med Admin: 2 (2/14/2024  1:50 PM)  Pain Rating Post Med Admin: 2 (2/14/2024  1:50 PM)  Patricia Score: 10 (2/14/2024  1:58 PM)

## 2024-02-19 DIAGNOSIS — D24.1 BENIGN NEOPLASM OF RIGHT BREAST: Primary | ICD-10-CM

## 2024-02-20 LAB
FINAL PATHOLOGIC DIAGNOSIS: NORMAL
GROSS: NORMAL
Lab: NORMAL

## 2024-02-22 ENCOUNTER — OFFICE VISIT (OUTPATIENT)
Dept: SURGERY | Facility: CLINIC | Age: 44
End: 2024-02-22
Payer: OTHER GOVERNMENT

## 2024-02-22 VITALS
SYSTOLIC BLOOD PRESSURE: 156 MMHG | BODY MASS INDEX: 32.08 KG/M2 | HEIGHT: 65 IN | WEIGHT: 192.56 LBS | HEART RATE: 75 BPM | DIASTOLIC BLOOD PRESSURE: 92 MMHG

## 2024-02-22 DIAGNOSIS — D24.1 BENIGN NEOPLASM OF RIGHT BREAST: Primary | ICD-10-CM

## 2024-02-22 PROCEDURE — 99024 POSTOP FOLLOW-UP VISIT: CPT | Mod: ,,, | Performed by: SURGERY

## 2024-02-22 PROCEDURE — 99999 PR PBB SHADOW E&M-EST. PATIENT-LVL III: CPT | Mod: PBBFAC,,, | Performed by: SURGERY

## 2024-02-22 PROCEDURE — 99213 OFFICE O/P EST LOW 20 MIN: CPT | Mod: PBBFAC | Performed by: SURGERY

## 2024-02-22 NOTE — PROGRESS NOTES
Surgery Clinic Note    Mili Newsome is a 43 y.o. 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 She feels great. Incision healing well no seroma or hematoma.  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 No cellulitis or tenderness or fluctuance    A/P:  Mili Newsome is a 43 y.o. year old female s/p benign phyllodes excision of right breast    -continue annual surveillance MMG  -rtc prn    Duncan Pineda  General Surgery - [facility]  2/22/2024

## 2024-08-27 DIAGNOSIS — Z12.31 ENCOUNTER FOR SCREENING MAMMOGRAM FOR MALIGNANT NEOPLASM OF BREAST: Primary | ICD-10-CM

## 2025-06-24 DIAGNOSIS — Z12.31 ENCOUNTER FOR SCREENING MAMMOGRAM FOR MALIGNANT NEOPLASM OF BREAST: Primary | ICD-10-CM

## 2025-07-03 ENCOUNTER — HOSPITAL ENCOUNTER (OUTPATIENT)
Dept: RADIOLOGY | Facility: HOSPITAL | Age: 45
Discharge: HOME | End: 2025-07-03
Attending: FAMILY MEDICINE
Payer: OTHER GOVERNMENT

## 2025-07-03 DIAGNOSIS — Z12.31 ENCOUNTER FOR SCREENING MAMMOGRAM FOR MALIGNANT NEOPLASM OF BREAST: ICD-10-CM

## 2025-07-03 PROCEDURE — 77063 BREAST TOMOSYNTHESIS BI: CPT | Mod: TC

## 2025-07-16 ENCOUNTER — TELEPHONE (OUTPATIENT)
Dept: RADIOLOGY | Facility: HOSPITAL | Age: 45
End: 2025-07-16
Payer: OTHER GOVERNMENT

## 2025-07-17 ENCOUNTER — TELEPHONE (OUTPATIENT)
Dept: RADIOLOGY | Facility: HOSPITAL | Age: 45
End: 2025-07-17
Payer: OTHER GOVERNMENT

## 2025-07-17 NOTE — TELEPHONE ENCOUNTER
Spoke with patient and explained mammogram findings.Patient expressed understanding of results. Patient scheduled abnormal mammogram follow up appointment at [facility] on 7/23/2025.

## 2025-07-22 DIAGNOSIS — N63.15 UNSPECIFIED LUMP IN THE RIGHT BREAST, OVERLAPPING QUADRANTS: Primary | ICD-10-CM

## 2025-07-23 ENCOUNTER — TELEPHONE (OUTPATIENT)
Dept: RADIOLOGY | Facility: HOSPITAL | Age: 45
End: 2025-07-23
Payer: OTHER GOVERNMENT

## 2025-07-23 NOTE — TELEPHONE ENCOUNTER
Returned patients call, no answer. Left message with my contact information.       Copied from CRM #[id number]. Topic: Appointments - Appointment Rescheduling  >> Jul 23, 2025  1:31 PM Lambert wrote:  Who call ? Mili Newsome     What is the request Details : Pt calling to speak with someone in provider office regards r/s appt on 7/23 to 7/24. Please call pt back.        Can clinic  use patient portal  : No     What number to call back : [phone]

## 2025-07-28 ENCOUNTER — TELEPHONE (OUTPATIENT)
Dept: RADIOLOGY | Facility: HOSPITAL | Age: 45
End: 2025-07-28
Payer: OTHER GOVERNMENT

## 2025-07-29 ENCOUNTER — PATIENT MESSAGE (OUTPATIENT)
Dept: RADIOLOGY | Facility: HOSPITAL | Age: 45
End: 2025-07-29
Payer: OTHER GOVERNMENT

## 2025-07-30 ENCOUNTER — TELEPHONE (OUTPATIENT)
Dept: RADIOLOGY | Facility: HOSPITAL | Age: 45
End: 2025-07-30
Payer: OTHER GOVERNMENT

## 2025-07-30 NOTE — TELEPHONE ENCOUNTER
Called patient to reschedule abnormal mammogram follow up, no answer. Left message with my contact information.   Patient was left several messages and the patient has not called back to reschedule follow up recommended by the Radiologist. Mailed patient her results certified with a phone number to call and schedule follow up.

## 2025-08-14 ENCOUNTER — TELEPHONE (OUTPATIENT)
Dept: RADIOLOGY | Facility: HOSPITAL | Age: 45
End: 2025-08-14
Payer: OTHER GOVERNMENT

## 2025-08-25 ENCOUNTER — TELEPHONE (OUTPATIENT)
Dept: RADIOLOGY | Facility: HOSPITAL | Age: 45
End: 2025-08-25

## 2025-08-25 ENCOUNTER — HOSPITAL ENCOUNTER (OUTPATIENT)
Dept: RADIOLOGY | Facility: HOSPITAL | Age: 45
Discharge: HOME | End: 2025-08-25
Attending: FAMILY MEDICINE
Payer: OTHER GOVERNMENT

## 2025-08-25 DIAGNOSIS — R92.8 ABNORMAL FINDING ON BREAST IMAGING: ICD-10-CM

## 2025-08-25 PROCEDURE — 77065 DX MAMMO INCL CAD UNI: CPT | Mod: TC,RT

## 2025-08-25 PROCEDURE — 77061 BREAST TOMOSYNTHESIS UNI: CPT | Mod: 26,RT,, | Performed by: RADIOLOGY

## 2025-08-25 PROCEDURE — 77065 DX MAMMO INCL CAD UNI: CPT | Mod: 26,RT,, | Performed by: RADIOLOGY

## 2025-08-26 ENCOUNTER — TELEPHONE (OUTPATIENT)
Dept: RADIOLOGY | Facility: HOSPITAL | Age: 45
End: 2025-08-26
Payer: OTHER GOVERNMENT

## 2025-08-28 ENCOUNTER — TELEPHONE (OUTPATIENT)
Dept: RADIOLOGY | Facility: HOSPITAL | Age: 45
End: 2025-08-28
Payer: OTHER GOVERNMENT

## 2025-08-29 ENCOUNTER — TELEPHONE (OUTPATIENT)
Dept: RADIOLOGY | Facility: HOSPITAL | Age: 45
End: 2025-08-29
Payer: OTHER GOVERNMENT

## (undated) DEVICE — ELECTRODE REM PLYHSV RETURN 9

## (undated) DEVICE — GOWN NONREINF SET-IN SLV XL

## (undated) DEVICE — SUT SILK 3-0 BLK BR SH 30IN

## (undated) DEVICE — STRIP MEDI WND CLSR 1/2X4IN

## (undated) DEVICE — TOWEL OR DISP STRL BLUE 4/PK

## (undated) DEVICE — SYR 10CC LUER LOCK

## (undated) DEVICE — PACK DRAPE UNIVERSAL CONVERTOR

## (undated) DEVICE — SUT VICRYL 3-0 27 SH

## (undated) DEVICE — SUT VICRYL PLUS 4-0 P3 18IN

## (undated) DEVICE — APPLICATOR CHLORAPREP ORN 26ML

## (undated) DEVICE — TIP YANKAUERS BULB NO VENT

## (undated) DEVICE — GLOVE BIOGEL 7.5

## (undated) DEVICE — COVER OVERHEAD SURG LT BLUE

## (undated) DEVICE — NDL HYPO REG 25G X 1 1/2

## (undated) DEVICE — SUT MONOCRYL 4.0 PS2 CP496G

## (undated) DEVICE — GAUZE FLUFF XXLG 36X36 2 PLY

## (undated) DEVICE — BRA CLASSIC COMFORT 40 BLACK

## (undated) DEVICE — TOWEL OR NONABSORB ADH 17X26

## (undated) DEVICE — Device